# Patient Record
Sex: MALE | Race: WHITE | NOT HISPANIC OR LATINO | Employment: OTHER | RURAL
[De-identification: names, ages, dates, MRNs, and addresses within clinical notes are randomized per-mention and may not be internally consistent; named-entity substitution may affect disease eponyms.]

---

## 2021-12-25 ENCOUNTER — HOSPITAL ENCOUNTER (EMERGENCY)
Facility: HOSPITAL | Age: 61
Discharge: HOME | End: 2021-12-26
Payer: COMMERCIAL

## 2021-12-25 DIAGNOSIS — S22.42XA MULTIPLE FRACTURES OF RIBS, LEFT SIDE, INITIAL ENCOUNTER FOR CLOSED FRACTURE: ICD-10-CM
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
## 2021-12-25 DIAGNOSIS — S30.1XXA CONTUSION OF ABDOMINAL WALL, INITIAL ENCOUNTER: ICD-10-CM

## 2021-12-25 DIAGNOSIS — S20.219A CHEST WALL CONTUSION: ICD-10-CM

## 2021-12-25 DIAGNOSIS — R10.11 RIGHT UPPER QUADRANT PAIN: ICD-10-CM

## 2021-12-25 LAB
ALBUMIN SERPL BCP-MCNC: 4 G/DL (ref 3.5–5)
ALBUMIN/GLOB SERPL: 1.1 {RATIO}
ALP SERPL-CCNC: 75 U/L (ref 45–115)
ALT SERPL W P-5'-P-CCNC: 34 U/L (ref 16–61)
ANION GAP SERPL CALCULATED.3IONS-SCNC: 12 MMOL/L (ref 7–16)
AST SERPL W P-5'-P-CCNC: 29 U/L (ref 15–37)
BASOPHILS # BLD AUTO: 0.04 K/UL (ref 0–0.2)
BASOPHILS NFR BLD AUTO: 0.5 % (ref 0–1)
BILIRUB SERPL-MCNC: 0.6 MG/DL (ref 0–1.2)
BILIRUB UR QL STRIP: NEGATIVE
BUN SERPL-MCNC: 28 MG/DL (ref 7–18)
BUN/CREAT SERPL: 16 (ref 6–20)
CALCIUM SERPL-MCNC: 9.7 MG/DL (ref 8.5–10.1)
CHLORIDE SERPL-SCNC: 104 MMOL/L (ref 98–107)
CLARITY UR: ABNORMAL
CO2 SERPL-SCNC: 30 MMOL/L (ref 21–32)
COLOR UR: YELLOW
CREAT SERPL-MCNC: 1.71 MG/DL (ref 0.7–1.3)
DIFFERENTIAL METHOD BLD: ABNORMAL
EOSINOPHIL # BLD AUTO: 0.07 K/UL (ref 0–0.5)
EOSINOPHIL NFR BLD AUTO: 0.9 % (ref 1–4)
ERYTHROCYTE [DISTWIDTH] IN BLOOD BY AUTOMATED COUNT: 12.8 % (ref 11.5–14.5)
GLOBULIN SER-MCNC: 3.6 G/DL (ref 2–4)
GLUCOSE SERPL-MCNC: 118 MG/DL (ref 74–106)
GLUCOSE UR STRIP-MCNC: NEGATIVE MG/DL
HCT VFR BLD AUTO: 37.7 % (ref 40–54)
HGB BLD-MCNC: 12.7 G/DL (ref 13.5–18)
KETONES UR STRIP-SCNC: NEGATIVE MG/DL
LEUKOCYTE ESTERASE UR QL STRIP: NEGATIVE
LYMPHOCYTES # BLD AUTO: 1.64 K/UL (ref 1–4.8)
LYMPHOCYTES NFR BLD AUTO: 21.4 % (ref 27–41)
MCH RBC QN AUTO: 30.4 PG (ref 27–31)
MCHC RBC AUTO-ENTMCNC: 33.7 G/DL (ref 32–36)
MCV RBC AUTO: 90.2 FL (ref 80–96)
MONOCYTES # BLD AUTO: 1.39 K/UL (ref 0–0.8)
MONOCYTES NFR BLD AUTO: 18.1 % (ref 2–6)
MPC BLD CALC-MCNC: 9.9 FL (ref 9.4–12.4)
NEUTROPHILS # BLD AUTO: 4.54 K/UL (ref 1.8–7.7)
NEUTROPHILS NFR BLD AUTO: 59.1 % (ref 53–65)
NITRITE UR QL STRIP: NEGATIVE
PH UR STRIP: 7 PH UNITS
PLATELET # BLD AUTO: 243 K/UL (ref 150–400)
POTASSIUM SERPL-SCNC: 4.1 MMOL/L (ref 3.5–5.1)
PROT SERPL-MCNC: 7.6 G/DL (ref 6.4–8.2)
PROT UR QL STRIP: NEGATIVE
RBC # BLD AUTO: 4.18 M/UL (ref 4.6–6.2)
RBC # UR STRIP: NEGATIVE /UL
SODIUM SERPL-SCNC: 142 MMOL/L (ref 136–145)
SP GR UR STRIP: 1.02
UROBILINOGEN UR STRIP-ACNC: 2 MG/DL
WBC # BLD AUTO: 7.68 K/UL (ref 4.5–11)

## 2021-12-25 PROCEDURE — 85025 COMPLETE CBC W/AUTO DIFF WBC: CPT | Performed by: NURSE PRACTITIONER

## 2021-12-25 PROCEDURE — 81003 URINALYSIS AUTO W/O SCOPE: CPT | Performed by: NURSE PRACTITIONER

## 2021-12-25 PROCEDURE — 80053 COMPREHEN METABOLIC PANEL: CPT | Performed by: NURSE PRACTITIONER

## 2021-12-25 PROCEDURE — 99284 EMERGENCY DEPT VISIT MOD MDM: CPT | Mod: ,,, | Performed by: NURSE PRACTITIONER

## 2021-12-25 PROCEDURE — 96374 THER/PROPH/DIAG INJ IV PUSH: CPT

## 2021-12-25 PROCEDURE — 99284 PR EMERGENCY DEPT VISIT,LEVEL IV: ICD-10-PCS | Mod: ,,, | Performed by: NURSE PRACTITIONER

## 2021-12-25 PROCEDURE — 99285 EMERGENCY DEPT VISIT HI MDM: CPT | Mod: 25

## 2021-12-25 PROCEDURE — 63600175 PHARM REV CODE 636 W HCPCS: Performed by: NURSE PRACTITIONER

## 2021-12-25 PROCEDURE — 36415 COLL VENOUS BLD VENIPUNCTURE: CPT | Performed by: NURSE PRACTITIONER

## 2021-12-25 PROCEDURE — 96372 THER/PROPH/DIAG INJ SC/IM: CPT | Mod: 59

## 2021-12-25 RX ORDER — GABAPENTIN 600 MG/1
600 TABLET ORAL 3 TIMES DAILY
COMMUNITY

## 2021-12-25 RX ORDER — ALBUTEROL SULFATE 90 UG/1
1 AEROSOL, METERED RESPIRATORY (INHALATION)
COMMUNITY
Start: 2021-07-15

## 2021-12-25 RX ORDER — HYDROCODONE BITARTRATE AND HOMATROPINE METHYLBROMIDE ORAL SOLUTION 5; 1.5 MG/5ML; MG/5ML
LIQUID ORAL
COMMUNITY

## 2021-12-25 RX ORDER — AMLODIPINE AND BENAZEPRIL HYDROCHLORIDE 5; 20 MG/1; MG/1
1 CAPSULE ORAL DAILY
COMMUNITY

## 2021-12-25 RX ORDER — MELOXICAM 15 MG/1
TABLET ORAL
COMMUNITY

## 2021-12-25 RX ORDER — MONTELUKAST SODIUM 10 MG/1
TABLET ORAL
COMMUNITY

## 2021-12-25 RX ORDER — CETIRIZINE HYDROCHLORIDE 10 MG/1
10 TABLET ORAL DAILY
COMMUNITY

## 2021-12-25 RX ORDER — NAPROXEN SODIUM 220 MG/1
81 TABLET, FILM COATED ORAL DAILY
COMMUNITY

## 2021-12-25 RX ORDER — FENOFIBRATE 145 MG/1
1 TABLET, FILM COATED ORAL DAILY
COMMUNITY

## 2021-12-25 RX ORDER — ONDANSETRON 2 MG/ML
4 INJECTION INTRAMUSCULAR; INTRAVENOUS
Status: COMPLETED | OUTPATIENT
Start: 2021-12-25 | End: 2021-12-25

## 2021-12-25 RX ORDER — MORPHINE SULFATE 4 MG/ML
4 INJECTION, SOLUTION INTRAMUSCULAR; INTRAVENOUS
Status: COMPLETED | OUTPATIENT
Start: 2021-12-25 | End: 2021-12-25

## 2021-12-25 RX ADMIN — MORPHINE SULFATE 4 MG: 4 INJECTION INTRAVENOUS at 10:12

## 2021-12-25 RX ADMIN — ONDANSETRON 4 MG: 2 INJECTION INTRAMUSCULAR; INTRAVENOUS at 10:12

## 2021-12-26 ENCOUNTER — TELEPHONE (OUTPATIENT)
Dept: EMERGENCY MEDICINE | Facility: HOSPITAL | Age: 61
End: 2021-12-26
Payer: COMMERCIAL

## 2021-12-26 VITALS
HEIGHT: 70 IN | DIASTOLIC BLOOD PRESSURE: 86 MMHG | SYSTOLIC BLOOD PRESSURE: 145 MMHG | BODY MASS INDEX: 35.07 KG/M2 | TEMPERATURE: 97 F | HEART RATE: 75 BPM | OXYGEN SATURATION: 98 % | RESPIRATION RATE: 18 BRPM | WEIGHT: 245 LBS

## 2021-12-26 PROCEDURE — 63600175 PHARM REV CODE 636 W HCPCS: Performed by: NURSE PRACTITIONER

## 2021-12-26 PROCEDURE — 93010 EKG 12-LEAD: ICD-10-PCS | Mod: ,,, | Performed by: INTERNAL MEDICINE

## 2021-12-26 PROCEDURE — 99900035 HC TECH TIME PER 15 MIN (STAT)

## 2021-12-26 PROCEDURE — 93005 ELECTROCARDIOGRAM TRACING: CPT

## 2021-12-26 PROCEDURE — 93010 ELECTROCARDIOGRAM REPORT: CPT | Mod: ,,, | Performed by: INTERNAL MEDICINE

## 2021-12-26 RX ORDER — HYDROCODONE BITARTRATE AND ACETAMINOPHEN 5; 325 MG/1; MG/1
1 TABLET ORAL EVERY 6 HOURS PRN
Qty: 20 TABLET | Refills: 0 | Status: SHIPPED | OUTPATIENT
Start: 2021-12-26 | End: 2021-12-26

## 2021-12-26 RX ORDER — MORPHINE SULFATE 4 MG/ML
4 INJECTION, SOLUTION INTRAMUSCULAR; INTRAVENOUS
Status: COMPLETED | OUTPATIENT
Start: 2021-12-26 | End: 2021-12-26

## 2021-12-26 RX ORDER — HYDROCODONE BITARTRATE AND ACETAMINOPHEN 5; 325 MG/1; MG/1
1 TABLET ORAL EVERY 6 HOURS PRN
Qty: 20 TABLET | Refills: 0 | Status: SHIPPED | OUTPATIENT
Start: 2021-12-26 | End: 2021-12-31

## 2021-12-26 RX ADMIN — MORPHINE SULFATE 4 MG: 4 INJECTION INTRAVENOUS at 01:12

## 2021-12-26 NOTE — ED TRIAGE NOTES
Patient was on an incline pulling a log from a pond when he slipped and hit the ground resulting in left sided rib pain at about 1600 today

## 2021-12-26 NOTE — ED PROVIDER NOTES
Encounter Date: 12/25/2021       History     Chief Complaint   Patient presents with    Rib Injury     Left sided rib pain after he lost his footing and fell     Presented with c/o left posterior and lateral rib pain with movement and deep breaths since slipping and falling on back while walking down and hill today around 1600. Denies head injury, headache, neck pain or loss of consciousness. States has chronic back pain with radiation of pain to legs that he is scheduled for injection for next week. Denies  Back pain, leg pain, leg weakness, or incontinence of B&B.        Review of patient's allergies indicates:  No Known Allergies  Past Medical History:   Diagnosis Date    Hyperlipemia     Hypertension      Past Surgical History:   Procedure Laterality Date    CHOLECYSTECTOMY      KNEE ARTHROPLASTY      TONSILLECTOMY       History reviewed. No pertinent family history.  Social History     Tobacco Use    Smoking status: Never Smoker    Smokeless tobacco: Never Used   Substance Use Topics    Alcohol use: Never    Drug use: Never     Review of Systems   Constitutional: Negative.  Negative for chills and fever.   HENT: Negative.    Respiratory: Negative for cough, shortness of breath and wheezing.         Pain to left ribs with deep breathing or cough   Cardiovascular: Negative for chest pain and leg swelling.   Gastrointestinal: Negative.  Negative for abdominal pain, nausea and vomiting.   Genitourinary: Negative.  Negative for difficulty urinating.   Musculoskeletal: Positive for back pain (left posterior rib pain).   Neurological: Negative.  Negative for dizziness, weakness, numbness and headaches.   Psychiatric/Behavioral: Negative.        Physical Exam     Initial Vitals [12/25/21 2211]   BP Pulse Resp Temp SpO2   (!) 150/78 73 20 97.4 °F (36.3 °C) 98 %      MAP       --         Physical Exam    Constitutional: He appears well-developed and well-nourished. No distress.   HENT:   Head: Normocephalic.    Right Ear: External ear normal.   Left Ear: External ear normal.   Nose: Nose normal.   Mouth/Throat: Oropharynx is clear and moist.   Eyes: Conjunctivae and EOM are normal. Pupils are equal, round, and reactive to light.   Neck: Neck supple.   No midline cervical tenderness or deformity   Normal range of motion.  Cardiovascular: Normal rate, regular rhythm, normal heart sounds and intact distal pulses.   Pulmonary/Chest: Breath sounds normal. He exhibits tenderness (left lower lateral and posterior ribs).   Abdominal: Abdomen is soft. Bowel sounds are normal. He exhibits no distension. There is no abdominal tenderness. There is no rebound and no guarding.   Musculoskeletal:         General: Tenderness (point tenderness to left lower lateral and posterior ribs) present. No edema. Normal range of motion.      Cervical back: Normal, normal range of motion and neck supple. No tenderness or bony tenderness. Normal range of motion.      Thoracic back: Normal.      Lumbar back: Normal. No tenderness or bony tenderness.        Back:      Neurological: He is alert and oriented to person, place, and time. He has normal strength. GCS score is 15. GCS eye subscore is 4. GCS verbal subscore is 5. GCS motor subscore is 6.   Skin: Skin is warm and dry. Capillary refill takes less than 2 seconds.   Psychiatric: He has a normal mood and affect. His behavior is normal. Judgment and thought content normal.         Medical Screening Exam   See Full Note    ED Course   Procedures  Labs Reviewed   URINALYSIS, REFLEX TO URINE CULTURE - Abnormal; Notable for the following components:       Result Value    Clarity, UA Slightly Cloudy (*)     Urobilinogen, UA 2.0 (*)     All other components within normal limits   COMPREHENSIVE METABOLIC PANEL - Abnormal; Notable for the following components:    Glucose 118 (*)     BUN 28 (*)     Creatinine 1.71 (*)     eGFR 44 (*)     All other components within normal limits   CBC WITH DIFFERENTIAL -  Abnormal; Notable for the following components:    RBC 4.18 (*)     Hemoglobin 12.7 (*)     Hematocrit 37.7 (*)     Lymphocytes % 21.4 (*)     Monocytes % 18.1 (*)     Eosinophils % 0.9 (*)     Monocytes, Absolute 1.39 (*)     All other components within normal limits   CBC W/ AUTO DIFFERENTIAL    Narrative:     The following orders were created for panel order CBC auto differential.  Procedure                               Abnormality         Status                     ---------                               -----------         ------                     CBC with Differential[[id number]]        Abnormal            Final result                 Please view results for these tests on the individual orders.     EKG Readings: (Independently Interpreted)   Initial Reading: No STEMI. Rhythm: Normal Sinus Rhythm. Heart Rate: 69.   Reviewed at 0007.     ECG Results          EKG 12-lead (Final result)  Result time 01/07/22 11:15:48    Final result by Interface, Lab In [facility] (01/07/22 11:15:48)                 Narrative:    Test Reason : S20.219A,    Vent. Rate : 069 BPM     Atrial Rate : 069 BPM     P-R Int : 170 ms          QRS Dur : 084 ms      QT Int : 394 ms       P-R-T Axes : 059 058 057 degrees     QTc Int : 422 ms    Normal sinus rhythm  Normal ECG  No previous ECGs available  Confirmed by Lani PENA, Rehmat U. ([id number]) on 1/7/2022 11:15:38 AM    Referred By: [email or web address]   SELF           Confirmed By:Rehmaflorinda UIngrid Garibay MD                            Imaging Results          CT Abdomen Pelvis  Without Contrast (Final result)  Result time 12/26/21 11:21:09   Procedure changed from CT Abdomen Pelvis With Contrast     Final result by Kristofer Fontanez MD (12/26/21 11:21:09)                 Impression:      Acute mildly displaced fractures of the left 10th and 11th ribs    No definite acute abdominal process      Electronically signed by: Kristofer Fontanez  Date:    12/26/2021  Time:    11:21             Narrative:     EXAMINATION:  CT ABDOMEN AND PELVIS without contrast    CLINICAL HISTORY:  Left-sided rib pain and left flank ecchymosis after fall    TECHNIQUE:  Axial CT images were obtained through the abdomen and pelvis without contrast oral contrast was not given.  Coronal and sagittal reconstructions submitted and interpreted.  Total DLP 1737.3 mGycm.  Automated exposure control utilized.    COMPARISON:  No previous abdominal CT available    FINDINGS:  CT abdomen:    Acute mildly displaced fractures of the posterior aspects of the left 10th and 11th ribs are noted.  There is no jett pneumonia in the partially visualized lung bases.    There has been previous cholecystectomy.    Liver, bile ducts, pancreas, spleen, adrenal glands, and kidneys are unremarkable in noncontrast CT appearance.  There is no hydronephrosis.  There is no radiopaque renal or ureteral stone.    Stomach is moderately distended with fluid.  Appendix is identified and appears normal.  There is no jett bowel obstruction.  There is mild to moderate retained stool in the colon.  No free fluid or pneumoperitoneum.    There is no lymphadenopathy by short-axis diameter criteria.  There is no aneurysm of the mildly calcified abdominal aorta.    CT pelvis:    Urinary bladder is mildly distended there are small fat containing inguinal hernias, left more than right.  No definite pelvic mass is seen.  There is no pelvic lymphadenopathy by short-axis diameter criteria.    No additional acute osseous findings.                               XR Ribs Min 3 views w/PA Chest Left (Final result)  Result time 12/26/21 11:15:18   Procedure changed from X-Ray Ribs 2 View Left     Final result by Kristofer Fontanez MD (12/26/21 11:15:18)                 Impression:      Acute mildly displaced fracture left 10th rib    Mild subsegmental atelectasis in the lung bases.      Electronically signed by: Kristofer Fontanez  Date:    12/26/2021  Time:    11:15             Narrative:     EXAMINATION:  XR RIBS MIN 3 VIEWS W/ PA CHEST LEFT    CLINICAL HISTORY:  fall;.  Fall on same level from slipping, tripping and stumbling without subsequent striking against object, initial encounter.  Left rib pain    TECHNIQUE:  PA chest with AP and oblique views left ribcage    COMPARISON:  No previous similar    The exam was also reviewed by [facility].    FINDINGS:  There is an acute mildly displaced fracture of the posterolateral aspect of the left 10th rib.    There is no pneumothorax.  There is no pleural effusion of significance by this modality.    There is some mild platelike subsegmental atelectasis in either lung base.  Lungs are otherwise clear    Cardiomediastinal silhouette is stable.  Pulmonary vasculature is not engorged.                              X-Rays:   Independently Interpreted Readings:   Chest X-Ray: Possible nondisplaced left 10th rib fracture. Small left pleural effusion. Left greater than right bibasilar opacities could represent atelectasis or contusion.     Medications   morphine injection 4 mg (4 mg Intramuscular Given 12/25/21 2245)   ondansetron injection 4 mg (4 mg Intramuscular Given 12/25/21 2246)   morphine injection 4 mg (4 mg Intravenous Given 12/26/21 0111)     Medical Decision Making:   ED Management:  UA neg for hematuria. Fracture of left 10 rib with small pleural effusion and bibasilar opacities. Diminished breath sounds bilat. Ecchymosis over left kidney noted on exam with exquisite tenderness there and laterally. Contacted [facility] for order for CT abd/pelvis with contrast. Dr. Trinh, [facility] ED is in agreement. CT shows left 10th and 11th rib fractures with tiny left pleural effusion and bibasilar opacities due to atelecstasis. No intraabdominal abnormality is visible.    Discussed pt's case with Dr. Trinh, [facility] ED. He did an audiovisual consult with pt. Will discharge with incentive spirometer and pain med and low threshold for follow-up.   Instructed pt on need for  incentive spirometer (educated with use by RT in ED), pain med and close follow-up with wife and pt.                     Clinical Impression:   Final diagnoses:  [W01.0XXA] Fall from slip, trip, or stumble, initial encounter (Primary)  [S20.219A] Chest wall contusion  [S22.42XA] Multiple fractures of ribs, left side, initial encounter for closed fracture  [S30.1XXA] Contusion of abdominal wall, initial encounter          ED Disposition Condition    Discharge Stable        ED Prescriptions     Medication Sig Dispense Start Date End Date Auth. Provider    HYDROcodone-acetaminophen (NORCO) 5-325 mg per tablet  (Status: Discontinued) Take 1 tablet by mouth every 6 (six) hours as needed for Pain. 20 tablet 2021 Jessica Kulkarni NP    HYDROcodone-acetaminophen (NORCO) 5-325 mg per tablet  (Status: Discontinued) Take 1 tablet by mouth every 6 (six) hours as needed for Pain. 20 tablet 2021 Jessica Kulkarni NP    HYDROcodone-acetaminophen (NORCO) 5-325 mg per tablet () Take 1 tablet by mouth every 6 (six) hours as needed for Pain. 20 tablet 2021 Jessica Kulkarni NP        Follow-up Information     Follow up With Specialties Details Why Contact Info    Darcie Richardson MD Internal Medicine In 3 days for re-evaluation [address]  [facility]  [place] [address]  [phone]             Jessica Kulkarni NP  [phone]       Jessica Kulkarni NP  [phone]

## 2021-12-26 NOTE — DISCHARGE INSTRUCTIONS
Use incentive spirometer as directed every 2 hours. Make sure you are taking deep breaths regularly to prevent pneumonia. Take Hydrocodone/acetaminophen as prescribed for pain. Follow-up with your PCP in 2-3 days for recheck. Return to the ED for worsening pain, trouble breathing, or fever.